# Patient Record
Sex: MALE | Race: WHITE | NOT HISPANIC OR LATINO | Employment: FULL TIME | URBAN - METROPOLITAN AREA
[De-identification: names, ages, dates, MRNs, and addresses within clinical notes are randomized per-mention and may not be internally consistent; named-entity substitution may affect disease eponyms.]

---

## 2017-10-16 ENCOUNTER — ALLIED HEALTH/NURSE VISIT (OUTPATIENT)
Dept: NURSING | Facility: CLINIC | Age: 22
End: 2017-10-16
Payer: COMMERCIAL

## 2017-10-16 DIAGNOSIS — Z23 NEED FOR PROPHYLACTIC VACCINATION AND INOCULATION AGAINST INFLUENZA: Primary | ICD-10-CM

## 2017-10-16 PROCEDURE — 90686 IIV4 VACC NO PRSV 0.5 ML IM: CPT

## 2017-10-16 PROCEDURE — 90471 IMMUNIZATION ADMIN: CPT

## 2017-10-16 PROCEDURE — 99207 ZZC NO CHARGE NURSE ONLY: CPT

## 2017-10-16 NOTE — MR AVS SNAPSHOT
After Visit Summary   10/16/2017    Martin Bains    MRN: [id number]           Patient Information     Date Of Birth          1995        Visit Information        Provider Department      10/16/2017 3:00 PM JOHN NURSE AB [facility] [place]        Today's Diagnoses     Need for prophylactic vaccination and inoculation against influenza    -  1       Follow-ups after your visit        Who to contact     If you have questions or need follow up information about today's clinic visit or your schedule please contact [facility] directly at [phone].  Normal or non-critical lab and imaging results will be communicated to you by Cogniihart, letter or phone within 4 business days after the clinic has received the results. If you do not hear from us within 7 days, please contact the clinic through Statzupt or phone. If you have a critical or abnormal lab result, we will notify you by phone as soon as possible.  Submit refill requests through PlayJam or call your pharmacy and they will forward the refill request to us. Please allow 3 business days for your refill to be completed.          Additional Information About Your Visit        MyChart Information     PlayJam gives you secure access to your electronic health record. If you see a primary care provider, you can also send messages to your care team and make appointments. If you have questions, please call your primary care clinic.  If you do not have a primary care provider, please call [phone] and they will assist you.        Care EveryWhere ID     This is your Care EveryWhere ID. This could be used by other organizations to access your [facility] medical records  [id number]         Blood Pressure from Last 3 Encounters:   11/25/16 112/72   07/25/16 112/60   08/25/15 116/66    Weight from Last 3 Encounters:   11/25/16 166 lb (75.3 kg)   07/25/16 164 lb 1 oz (74.4 kg)   08/25/15 148 lb 2 oz (67.2 kg) (42 %)*     * Growth percentiles  are based on [facility] 2-20 Years data.              We Performed the Following     FLU VAC, SPLIT VIRUS IM > 3 YO (QUADRIVALENT) [09034]     Vaccine Administration, Initial [75615]        Primary Care Provider Office Phone # Fax #    Camilo Turner [phone] [address] [place] [address]        Equal Access to Services     [facility]: Hadii aad ku hadasho Soomaali, waaxda luqadaha, qaybta kaalmada adeegyada, waxay idiin hayaan adeeg khcarlyloraine lamiriamn . So [facility] [phone].    ATENCIÓN: Si habla español, tiene a hubbard disposición servicios gratuitos de asistencia lingüística. Llame al [phone].    We comply with applicable federal civil rights laws and [place] laws. We do not discriminate on the basis of race, color, national origin, age, disability, sex, sexual orientation, or gender identity.            Thank you!     [facility]. Our goal is always to provide you with excellent care. Hearing back from our patients is one way we can continue to improve our services. Please take a few minutes to complete the written survey that you may receive in the mail after your visit with us. Thank you!             Your Updated Medication List - Protect others around you: Learn how to safely use, store and throw away your medicines at www.disposemymeds.org.          This list is accurate as of: 10/16/17  3:09 PM.  Always use your most recent med list.                   Brand Name Dispense Instructions for use Diagnosis    cholecalciferol 1000 UNIT tablet    vitamin D    100 tablet    Take 1 tablet (1,000 Units) by mouth daily        CLARITIN 10 MG tablet   Generic drug:  loratadine     30 tablet    Take 1 tablet (10 mg) by mouth daily        imiquimod 5 % cream    ALDARA    12 packet    Apply a small sized amount to warts three times weekly at bedtime.   Wash off after 8 hours.   May use for up to 16 weeks.    Plantar wart, right foot        VITAMIN C PO

## 2018-06-09 ENCOUNTER — MYC MEDICAL ADVICE (OUTPATIENT)
Dept: PEDIATRICS | Facility: CLINIC | Age: 23
End: 2018-06-09

## 2018-06-09 NOTE — LETTER
June 11, 2018      Martin Bains  [address] [place] [address]        To Whom It May Concern:    Martin Bains is seen in our clinic.     He suffers from  Nonverbal Learning Disorder (NVLD), which includes a sensory integration disorder, and possesses sensitivity behaviors that fall within the autism spectrum. Therefore, I have requested [facility] that I be placed in a living unit (small apartment) which is as quiet as possible and not near a heavily trafficked part of the building.    This condition is a legal disability, and is necessary for him to fully use and enjoy his apartment.          Sincerely,        Camilo Turner MD

## 2018-06-11 NOTE — TELEPHONE ENCOUNTER
"Patient is needing a letter to their  with the requirements below in the letter. This is a request that the patient live in a small, quiet apartment not near a heavily trafficked part of the building.     Email or mail letter as directed in Balakam message.     Copied from Balakam message:  \"We will require communication from a qualified person (such as a physician, therapist, counselor or other   professional) who is  familiar with you and your condition,  to confirm:  (1) whether you are disabled as   defined by law, (2) what accommodation is needed, and (3) whether the accommodation is necessary in order for you to fully use and enjoy your apartment.  Sometimes we will also need confirmation of the connection  between the accommodation and the disability, where this is not clear to us.  Confirmation can come in writing such as a letter from a doctor or therapist with the author s contact information, but  can also occur  via a phone conversation or e&#[id number];mail.\"      "

## 2018-06-12 ENCOUNTER — OFFICE VISIT (OUTPATIENT)
Dept: PEDIATRICS | Facility: CLINIC | Age: 23
End: 2018-06-12
Payer: COMMERCIAL

## 2018-06-12 VITALS
HEART RATE: 65 BPM | BODY MASS INDEX: 21.87 KG/M2 | DIASTOLIC BLOOD PRESSURE: 78 MMHG | OXYGEN SATURATION: 99 % | WEIGHT: 165 LBS | HEIGHT: 73 IN | TEMPERATURE: 97.8 F | SYSTOLIC BLOOD PRESSURE: 112 MMHG

## 2018-06-12 DIAGNOSIS — F88 SENSORY INTEGRATION DISORDER: ICD-10-CM

## 2018-06-12 DIAGNOSIS — Z23 NEED FOR TETANUS BOOSTER: Primary | ICD-10-CM

## 2018-06-12 DIAGNOSIS — Z23 NEED FOR HPV VACCINATION: ICD-10-CM

## 2018-06-12 PROCEDURE — 90715 TDAP VACCINE 7 YRS/> IM: CPT | Performed by: INTERNAL MEDICINE

## 2018-06-12 PROCEDURE — 90471 IMMUNIZATION ADMIN: CPT | Performed by: INTERNAL MEDICINE

## 2018-06-12 PROCEDURE — 90472 IMMUNIZATION ADMIN EACH ADD: CPT | Performed by: INTERNAL MEDICINE

## 2018-06-12 PROCEDURE — 99213 OFFICE O/P EST LOW 20 MIN: CPT | Mod: 25 | Performed by: INTERNAL MEDICINE

## 2018-06-12 PROCEDURE — 90651 9VHPV VACCINE 2/3 DOSE IM: CPT | Performed by: INTERNAL MEDICINE

## 2018-06-12 NOTE — MR AVS SNAPSHOT
After Visit Summary   6/12/2018    Martin Bains    MRN: [id number]           Patient Information     Date Of Birth          1995        Visit Information        Provider Department      6/12/2018 1:00 PM [facility]        Today's Diagnoses     Need for tetanus booster    -  1      Care Instructions    Tetanus shot today.    Letter written today.    Follow up yearly or biannually for a complete physcial exam>    Camilo Turner MD  Internal Medicine and Pediatrics             Follow-ups after your visit        Follow-up notes from your care team     Return in about 1 year (around 6/12/2019) for Physical Exam.      Who to contact     If you have questions or need follow up information about today's clinic visit or your schedule please contact [facility] directly at [phone].  Normal or non-critical lab and imaging results will be communicated to you by MyChart, letter or phone within 4 business days after the clinic has received the results. If you do not hear from us within 7 days, please contact the clinic through Bug Labshart or phone. If you have a critical or abnormal lab result, we will notify you by phone as soon as possible.  Submit refill requests through Anteryon or call your pharmacy and they will forward the refill request to us. Please allow 3 business days for your refill to be completed.          Additional Information About Your Visit        MyChart Information     Anteryon gives you secure access to your electronic health record. If you see a primary care provider, you can also send messages to your care team and make appointments. If you have questions, please call your primary care clinic.  If you do not have a primary care provider, please call [phone] and they will assist you.        Care EveryWhere ID     This is your Care EveryWhere ID. This could be used by other organizations to access your [place] medical records  [id number]    "     Your Vitals Were     Pulse Temperature Height Pulse Oximetry BMI (Body Mass Index)       65 97.8  F (36.6  C) (Oral) 6' 1\" (1.854 m) 99% 21.77 kg/m2        Blood Pressure from Last 3 Encounters:   06/12/18 112/78   11/25/16 112/72   07/25/16 112/60    Weight from Last 3 Encounters:   06/12/18 165 lb (74.8 kg)   11/25/16 166 lb (75.3 kg)   07/25/16 164 lb 1 oz (74.4 kg)              We Performed the Following     TDAP VACCINE (ADACEL)        Primary Care Provider Office Phone # Fax #    Camilo Turner [phone]       [facility] [place] [address]        Equal Access to Services     [facility] : Hadii aad ku hadasho Sovenu, waaxda luqadaha, qaybta kaalmada adeegyada, bin myers . So [facility] [phone].    ATENCIÓN: Si habla español, tiene a hubbard disposición servicios gratuitos de asistencia lingüística. LlThe [facility] [phone].    We comply with applicable federal civil rights laws and [place] laws. We do not discriminate on the basis of race, color, national origin, age, disability, sex, sexual orientation, or gender identity.            Thank you!     Thank you for choosing [facility]  for your care. Our goal is always to provide you with excellent care. Hearing back from our patients is one way we can continue to improve our services. Please take a few minutes to complete the written survey that you may receive in the mail after your visit with us. Thank you!             Your Updated Medication List - Protect others around you: Learn how to safely use, store and throw away your medicines at www.disposemymeds.org.          This list is accurate as of 6/12/18  1:20 PM.  Always use your most recent med list.                   Brand Name Dispense Instructions for use Diagnosis    cholecalciferol 1000 UNIT tablet    vitamin D3    100 tablet    Take 1 tablet (1,000 Units) by mouth daily        CLARITIN 10 MG tablet   Generic drug:  " loratadine     30 tablet    Take 1 tablet (10 mg) by mouth daily        imiquimod 5 % cream    ALDARA    12 packet    Apply a small sized amount to warts three times weekly at bedtime.   Wash off after 8 hours.   May use for up to 16 weeks.    Plantar wart, right foot       VITAMIN C PO

## 2018-06-12 NOTE — NURSING NOTE
Screening Questionnaire for Adult Immunization    Are you sick today?   No   Do you have allergies to medications, food, a vaccine component or latex?   No   Have you ever had a serious reaction after receiving a vaccination?   No   Do you have a long-term health problem with heart disease, lung disease, asthma, kidney disease, metabolic disease (e.g. diabetes), anemia, or other blood disorder?   No   Do you have cancer, leukemia, HIV/AIDS, or any other immune system problem?   No   In the past 3 months, have you taken medications that affect  your immune system, such as prednisone, other steroids, or anticancer drugs; drugs for the treatment of rheumatoid arthritis, Crohn s disease, or psoriasis; or have you had radiation treatments?   No   Have you had a seizure, or a brain or other nervous system problem?   No   During the past year, have you received a transfusion of blood or blood     products, or been given immune (gamma) globulin or antiviral drug?   No   For women: Are you pregnant or is there a chance you could become        pregnant during the next month?   No   Have you received any vaccinations in the past 4 weeks?   No     Immunization questionnaire answers were all negative.        Per orders of Dr. Turner, injection of Adacel and Gardasil given by Asrtid Liao. Patient instructed to remain in clinic for 15 minutes afterwards, and to report any adverse reaction to me immediately.       Screening performed by Astrid Liao on 6/12/2018 at 1:30 PM.

## 2018-06-12 NOTE — PROGRESS NOTES
SUBJECTIVE:   Martin Bains is a 22 year old male who presents to clinic today for the following health issues:      Pt needs letter for graduate school.     To begin grad  School, 2 years at .      Feels like has inability to concentrate and sleep, since a young child.  Diagnosed with a sensory disorder, can cause frustration and anger.     In [facility], had a roommate; no issues.  But the new apartment he is going to is louder on weekends and during week.     Problem list and histories reviewed & adjusted, as indicated.  Additional history: as documented    Patient Active Problem List   Diagnosis     Sensory integration disorder     Past Surgical History:   Procedure Laterality Date     HC CREATE EARDRUM OPENING,GEN ANESTH  1997    P.E. Tubes       Social History   Substance Use Topics     Smoking status: Never Smoker     Smokeless tobacco: Never Used      Comment: non smoking home     Alcohol use No     Family History   Problem Relation Age of Onset     C.A.D. Maternal Grandmother      [id number]   maternal uncleDM and CAD     CANCER Paternal Grandmother       lung CA     CANCER Paternal Grandfather      Hodemmy  age 52     Family History Negative Mother          Current Outpatient Prescriptions   Medication Sig Dispense Refill     Ascorbic Acid (VITAMIN C PO)        cholecalciferol (VITAMIN D) 1000 UNIT tablet Take 1 tablet (1,000 Units) by mouth daily 100 tablet 3     imiquimod (ALDARA) 5 % cream Apply a small sized amount to warts three times weekly at bedtime.   Wash off after 8 hours.   May use for up to 16 weeks. 12 packet 3     loratadine (CLARITIN) 10 MG tablet Take 1 tablet (10 mg) by mouth daily 30 tablet 1     No Known Allergies  BP Readings from Last 3 Encounters:   18 112/78   16 112/72   16 112/60    Wt Readings from Last 3 Encounters:   18 165 lb (74.8 kg)   16 166 lb (75.3 kg)   16 164 lb 1 oz (74.4 kg)                  Labs reviewed in  "EPIC    Reviewed and updated as needed this visit by clinical staff  Tobacco  Allergies  Meds  Med Hx  Surg Hx  Fam Hx  Soc Hx      Reviewed and updated as needed this visit by Provider         ROS:  CONSTITUTIONAL: NEGATIVE for fever, chills, change in weight  ENT/MOUTH: NEGATIVE for ear, mouth and throat problems  RESP: NEGATIVE for significant cough or SOB  CV: NEGATIVE for chest pain, palpitations or peripheral edema    OBJECTIVE:                                                    /78 (BP Location: Right arm, Cuff Size: Adult Regular)  Pulse 65  Temp 97.8  F (36.6  C) (Oral)  Ht 6' 1\" (1.854 m)  Wt 165 lb (74.8 kg)  SpO2 99%  BMI 21.77 kg/m2  Body mass index is 21.77 kg/(m^2).   GENERAL: healthy, alert, well nourished, well hydrated, no distress  HENT: ear canals- normal; TMs- normal; Nose- normal; Mouth- no ulcers, no lesions  NECK: no tenderness, no adenopathy, no asymmetry, no masses, no stiffness; thyroid- normal to palpation  RESP: lungs clear to auscultation - no rales, no rhonchi, no wheezes  CV: regular rates and rhythm, normal S1 S2, no S3 or S4 and no murmur, no click or rub -  ABDOMEN: soft, no tenderness, no  hepatosplenomegaly, no masses, normal bowel sounds    Diagnostic test results:  Diagnostic Test Results:  none      ASSESSMENT/PLAN:                                                    1. Need for tetanus booster    - TDAP VACCINE (ADACEL)  - ADMIN 1st VACCINE    2. Need for HPV vaccination    - EA ADD'L VACCINE  - HPV, IM (9 - 26 YRS) - Gardasil 9    3. Sensory integration disorder  Letter written for his grad school housing.  Follow up for any new symptoms.     E4: Spent 25 minutes face to face.     More than 50% of the time was spent in counseling and coordination of care of these issues.        See Patient Instructions    Camilo Turner MD  [facility] [place]  sens   "

## 2018-06-12 NOTE — TELEPHONE ENCOUNTER
Letter printed, and was faxed to email provided below.   It should also be available on [employer] if he needs to print it.   Sent [employer] message letting him know as well.     Astrid Liao MA   June 12, 2018,  9:55 AM

## 2018-06-15 ENCOUNTER — MYC MEDICAL ADVICE (OUTPATIENT)
Dept: PEDIATRICS | Facility: CLINIC | Age: 23
End: 2018-06-15

## 2018-07-03 ENCOUNTER — MYC MEDICAL ADVICE (OUTPATIENT)
Dept: PEDIATRICS | Facility: CLINIC | Age: 23
End: 2018-07-03

## 2018-07-03 NOTE — LETTER
July 6, 2018      Martin Bains  [address] [address]        To Whom It May Concern:    Martin Bains was seen in our clinic. He must wear prescription ear plugs; they are a medical necessity (due to his sensory integration disorder). These ear plugs will help with his sleeping.    Sincerely,        Camilo Turner MD

## 2018-07-06 NOTE — TELEPHONE ENCOUNTER
Letter printed and fax to email address provided by pt.  Pt notified.     Astrid Liao MA   July 6, 2018,  8:57 AM

## 2018-08-14 ENCOUNTER — OFFICE VISIT (OUTPATIENT)
Dept: PEDIATRICS | Facility: CLINIC | Age: 23
End: 2018-08-14
Payer: COMMERCIAL

## 2018-08-14 VITALS
SYSTOLIC BLOOD PRESSURE: 114 MMHG | BODY MASS INDEX: 22 KG/M2 | OXYGEN SATURATION: 99 % | HEIGHT: 73 IN | DIASTOLIC BLOOD PRESSURE: 68 MMHG | TEMPERATURE: 98.1 F | HEART RATE: 78 BPM | WEIGHT: 166 LBS

## 2018-08-14 DIAGNOSIS — Z11.4 SCREENING FOR HIV (HUMAN IMMUNODEFICIENCY VIRUS): ICD-10-CM

## 2018-08-14 DIAGNOSIS — Z23 NEED FOR HPV VACCINE: ICD-10-CM

## 2018-08-14 DIAGNOSIS — Z00.00 ROUTINE GENERAL MEDICAL EXAMINATION AT A HEALTH CARE FACILITY: Primary | ICD-10-CM

## 2018-08-14 PROCEDURE — 90471 IMMUNIZATION ADMIN: CPT | Performed by: INTERNAL MEDICINE

## 2018-08-14 PROCEDURE — 99395 PREV VISIT EST AGE 18-39: CPT | Mod: 25 | Performed by: INTERNAL MEDICINE

## 2018-08-14 PROCEDURE — 90651 9VHPV VACCINE 2/3 DOSE IM: CPT | Performed by: INTERNAL MEDICINE

## 2018-08-14 ASSESSMENT — ENCOUNTER SYMPTOMS
CHILLS: 0
ABDOMINAL PAIN: 0
DIARRHEA: 0
COUGH: 0
HEMATOCHEZIA: 0
EYE PAIN: 0
PARESTHESIAS: 0
PALPITATIONS: 0
HEADACHES: 0
FEVER: 0
SHORTNESS OF BREATH: 0
WEAKNESS: 0
CONSTIPATION: 0
SORE THROAT: 0
MYALGIAS: 0
HEARTBURN: 0
HEMATURIA: 0
DIZZINESS: 0
FREQUENCY: 0
JOINT SWELLING: 0
DYSURIA: 0
NERVOUS/ANXIOUS: 0
ARTHRALGIAS: 0
NAUSEA: 0

## 2018-08-14 NOTE — MR AVS SNAPSHOT
After Visit Summary   8/14/2018    Martin Bains    MRN: [id number]           Patient Information     Date Of Birth          1995        Visit Information        Provider Department      8/14/2018 11:20 AM Camilo Turner MD [facility]        Today's Diagnoses     Screening for HIV (human immunodeficiency virus)        Need for HPV vaccine        Routine general medical examination at a health care facility          Care Instructions      HPV #2 today, and #3 in next 4-12 months.    Set up fasting labs.     Camilo Turner MD  Internal Medicine and Pediatrics       Preventive Health Recommendations  Male Ages 21 - 25     Yearly exam:             See your health care provider every year in order to  o   Review health changes.   o   Discuss preventive care.    o   Review your medicines if your doctor has prescribed any.    You should be tested each year for STDs (sexually transmitted diseases).     Talk to your provider about cholesterol testing.      If you are at risk for diabetes, you should have a diabetes test (fasting glucose).    Shots: Get a flu shot each year. Get a tetanus shot every 10 years.     Nutrition:    Eat at least 5 servings of fruits and vegetables daily.     Eat whole-grain bread, whole-wheat pasta and brown rice instead of white grains and rice.     Get adequate calcium and Vitamin D.     Lifestyle    Exercise for at least 150 minutes a week (30 minutes a day, 5 days a week). This will help you control your weight and prevent disease.     Limit alcohol to one drink per day.     No smoking.     Wear sunscreen to prevent skin cancer.     See your dentist every six months for an exam and cleaning.             Follow-ups after your visit        Follow-up notes from your care team     Return in about 1 year (around 8/14/2019) for Physical Exam.      Future tests that were ordered for you today     Open Future Orders        Priority Expected Expires Ordered    HIV  "Screening Routine  9/14/2018 8/14/2018    Lipid panel reflex to direct LDL Fasting Routine  9/14/2018 8/14/2018    Basic metabolic panel  (Ca, Cl, CO2, Creat, Gluc, K, Na, BUN) Routine  9/14/2018 8/14/2018            Who to contact     If you have questions or need follow up information about today's clinic visit or your schedule please contact [facility] [place] directly at [phone].  Normal or non-critical lab and imaging results will be communicated to you by SpiderCloud Wirelesshart, letter or phone within 4 business days after the clinic has received the results. If you do not hear from us within 7 days, please contact the clinic through Jiongji Appt or phone. If you have a critical or abnormal lab result, we will notify you by phone as soon as possible.  Submit refill requests through Shipu or call your pharmacy and they will forward the refill request to us. Please allow 3 business days for your refill to be completed.          Additional Information About Your Visit        Shipu Information     Shipu gives you secure access to your electronic health record. If you see a primary care provider, you can also send messages to your care team and make appointments. If you have questions, please call your primary care clinic.  If you do not have a primary care provider, please call [phone] and they will assist you.        Care EveryWhere ID     This is your Care EveryWhere ID. This could be used by other organizations to access your [place] medical records  [id number]        Your Vitals Were     Pulse Temperature Height Pulse Oximetry BMI (Body Mass Index)       78 98.1  F (36.7  C) (Oral) 6' 1\" (1.854 m) 99% 21.9 kg/m2        Blood Pressure from Last 3 Encounters:   08/14/18 114/68   06/12/18 112/78   11/25/16 112/72    Weight from Last 3 Encounters:   08/14/18 166 lb (75.3 kg)   06/12/18 165 lb (74.8 kg)   11/25/16 166 lb (75.3 kg)              We Performed the Following     HPV, IM (9 - 26 YRS) - Gardasil 9     "    Primary Care Provider Office Phone # Fax #    Camilo Turner [phone] [address] [place] [address]        Equal Access to Services     STARRIKI FRANKIE : Bebe mendoza leilani Laguna, wamelisada luqadaha, qaybta kakeithda viviana, bin rodrigues lamiriambimal amber. So [facility] [phone].    ATENCIÓN: Si habla español, tiene a hubbard disposición servicios gratuitos de asistencia lingüística. Llame al [phone].    We comply with applicable federal civil rights laws and [place] laws. We do not discriminate on the basis of race, color, national origin, age, disability, sex, sexual orientation, or gender identity.            Thank you!     Thank you for choosing [facility]  for your care. Our goal is always to provide you with excellent care. Hearing back from our patients is one way we can continue to improve our services. Please take a few minutes to complete the written survey that you may receive in the mail after your visit with us. Thank you!             Your Updated Medication List - Protect others around you: Learn how to safely use, store and throw away your medicines at www.disposemymeds.org.          This list is accurate as of 8/14/18 11:40 AM.  Always use your most recent med list.                   Brand Name Dispense Instructions for use Diagnosis    cholecalciferol 1000 UNIT tablet    vitamin D3    100 tablet    Take 1 tablet (1,000 Units) by mouth daily        CLARITIN 10 MG tablet   Generic drug:  loratadine     30 tablet    Take 1 tablet (10 mg) by mouth daily        VITAMIN C PO

## 2018-08-14 NOTE — NURSING NOTE
Screening Questionnaire for Adult Immunization    Are you sick today?   No   Do you have allergies to medications, food, a vaccine component or latex?   No   Have you ever had a serious reaction after receiving a vaccination?   No   Do you have a long-term health problem with heart disease, lung disease, asthma, kidney disease, metabolic disease (e.g. diabetes), anemia, or other blood disorder?   No   Do you have cancer, leukemia, HIV/AIDS, or any other immune system problem?   No   In the past 3 months, have you taken medications that affect  your immune system, such as prednisone, other steroids, or anticancer drugs; drugs for the treatment of rheumatoid arthritis, Crohn s disease, or psoriasis; or have you had radiation treatments?   No   Have you had a seizure, or a brain or other nervous system problem?   No   During the past year, have you received a transfusion of blood or blood     products, or been given immune (gamma) globulin or antiviral drug?   No   For women: Are you pregnant or is there a chance you could become        pregnant during the next month?   No   Have you received any vaccinations in the past 4 weeks?   No     Immunization questionnaire answers were all negative.        Per orders of Dr. Turner, injection of Gardasil given by Astrid Liao. Patient instructed to remain in clinic for 15 minutes afterwards, and to report any adverse reaction to me immediately.       Screening performed by Astrid Liao on 8/14/2018 at 11:46 AM.

## 2018-08-14 NOTE — PROGRESS NOTES
SUBJECTIVE:   CC: Martin Bains is an 23 year old male who presents for preventative health visit.     Physical   Annual:     Getting at least 3 servings of Calcium per day:  Yes    Bi-annual eye exam:  Yes    Dental care twice a year:  Yes    Sleep apnea or symptoms of sleep apnea:  None    Diet:  Regular (no restrictions)    Frequency of exercise:  4-5 days/week    Duration of exercise:  15-30 minutes    Taking medications regularly:  Not Applicable    Additional concerns today:  No      Today's PHQ-2 Score:   PHQ-2 ( 1999 Pfizer) 8/14/2018   Q1: Little interest or pleasure in doing things 0   Q2: Feeling down, depressed or hopeless 0   PHQ-2 Score 0   Q1: Little interest or pleasure in doing things Not at all   Q2: Feeling down, depressed or hopeless Not at all   PHQ-2 Score 0       Abuse: Current or Past(Physical, Sexual or Emotional)- No  Do you feel safe in your environment - Yes    Social History   Substance Use Topics     Smoking status: Never Smoker     Smokeless tobacco: Never Used      Comment: non smoking home     Alcohol use No     Alcohol Use 8/14/2018   If you drink alcohol do you typically have greater than 3 drinks per day OR greater than 7 drinks per week? No       Last PSA: No results found for: PSA    Reviewed orders with patient. Reviewed health maintenance and updated orders accordingly - Yes  Labs reviewed in EPIC  BP Readings from Last 3 Encounters:   08/14/18 114/68   06/12/18 112/78   11/25/16 112/72    Wt Readings from Last 3 Encounters:   08/14/18 166 lb (75.3 kg)   06/12/18 165 lb (74.8 kg)   11/25/16 166 lb (75.3 kg)                  Patient Active Problem List   Diagnosis     Sensory integration disorder     Past Surgical History:   Procedure Laterality Date     HC CREATE EARDRUM OPENING,GEN ANESTH  1997    P.E. Tubes       Social History   Substance Use Topics     Smoking status: Never Smoker     Smokeless tobacco: Never Used      Comment: non smoking home     Alcohol use No      "Family History   Problem Relation Age of Onset     C.A.D. Maternal Grandmother      [id number]   maternal uncleDM and CAD     Cancer Paternal Grandmother       lung CA     Cancer Paternal Grandfather      Artemio  age 52     Family History Negative Mother          Current Outpatient Prescriptions   Medication Sig Dispense Refill     Ascorbic Acid (VITAMIN C PO)        cholecalciferol (VITAMIN D) 1000 UNIT tablet Take 1 tablet (1,000 Units) by mouth daily 100 tablet 3     loratadine (CLARITIN) 10 MG tablet Take 1 tablet (10 mg) by mouth daily 30 tablet 1     No Known Allergies  No lab results found.     Reviewed and updated as needed this visit by clinical staff  Tobacco  Allergies  Meds  Med Hx  Surg Hx  Fam Hx  Soc Hx        Reviewed and updated as needed this visit by Provider          History reviewed. No pertinent past medical history.   Past Surgical History:   Procedure Laterality Date     HC CREATE EARDRUM OPENING,GEN ANESTH  1997    P.E. Tubes       Review of Systems   Constitutional: Negative for chills and fever.   HENT: Negative for congestion, ear pain, hearing loss and sore throat.    Eyes: Negative for pain and visual disturbance.   Respiratory: Negative for cough and shortness of breath.    Cardiovascular: Negative for chest pain, palpitations and peripheral edema.   Gastrointestinal: Negative for abdominal pain, constipation, diarrhea, heartburn, hematochezia and nausea.   Genitourinary: Negative for discharge, dysuria, frequency, genital sores, hematuria, impotence and urgency.   Musculoskeletal: Negative for arthralgias, joint swelling and myalgias.   Skin: Negative for rash.   Neurological: Negative for dizziness, weakness, headaches and paresthesias.   Psychiatric/Behavioral: Negative for mood changes. The patient is not nervous/anxious.          OBJECTIVE:   /68 (BP Location: Right arm, Cuff Size: Adult Regular)  Pulse 78  Temp 98.1  F (36.7  C) (Oral)  Ht 6' 1\" " "(1.854 m)  Wt 166 lb (75.3 kg)  SpO2 99%  BMI 21.9 kg/m2    Physical Exam  GENERAL: healthy, alert and no distress  EYES: Eyes grossly normal to inspection, PERRL and conjunctivae and sclerae normal  HENT: ear canals and TM's normal, nose and mouth without ulcers or lesions  NECK: no adenopathy, no asymmetry, masses, or scars and thyroid normal to palpation  RESP: lungs clear to auscultation - no rales, rhonchi or wheezes  CV: regular rate and rhythm, normal S1 S2, no S3 or S4, no murmur, click or rub, no peripheral edema and peripheral pulses strong  ABDOMEN: soft, nontender, no hepatosplenomegaly, no masses and bowel sounds normal   (male): normal male genitalia without lesions or urethral discharge, no hernia  MS: no gross musculoskeletal defects noted, no edema  SKIN: no suspicious lesions or rashes  NEURO: Normal strength and tone, mentation intact and speech normal  PSYCH: mentation appears normal, affect normal/bright    Diagnostic Test Results:  none     ASSESSMENT/PLAN:   1. Screening for HIV (human immunodeficiency virus)    - HIV Screening; Future    2. Need for HPV vaccine    - HPV, IM (9 - 26 YRS) - Gardasil 9    3. Routine general medical examination at a health care facility  To follow up in 1 -3 weeks for fasting blood work.   - Lipid panel reflex to direct LDL Fasting; Future  - Basic metabolic panel  (Ca, Cl, CO2, Creat, Gluc, K, Na, BUN); Future    COUNSELING:   Reviewed preventive health counseling, as reflected in patient instructions       Consider AAA screening for ages 65-75 and smoking history       Regular exercise       Healthy diet/nutrition       Vision screening       Family planning       Safe sex practices/STD prevention       Colon cancer screening       Prostate cancer screening    BP Readings from Last 1 Encounters:   08/14/18 114/68     Estimated body mass index is 21.9 kg/(m^2) as calculated from the following:    Height as of this encounter: 6' 1\" (1.854 m).    Weight as of " this encounter: 166 lb (75.3 kg).           reports that he has never smoked. He has never used smokeless tobacco.      Counseling Resources:  ATP IV Guidelines  Pooled Cohorts Equation Calculator  FRAX Risk Assessment  ICSI Preventive Guidelines  Dietary Guidelines for Americans, 2010  USDA's MyPlate  ASA Prophylaxis  Lung CA Screening    Camilo Turner MD  [facility]

## 2018-12-26 ENCOUNTER — ALLIED HEALTH/NURSE VISIT (OUTPATIENT)
Dept: NURSING | Facility: CLINIC | Age: 23
End: 2018-12-26
Payer: COMMERCIAL

## 2018-12-26 DIAGNOSIS — Z23 ENCOUNTER FOR IMMUNIZATION: Primary | ICD-10-CM

## 2018-12-26 PROCEDURE — 90651 9VHPV VACCINE 2/3 DOSE IM: CPT

## 2018-12-26 PROCEDURE — 90471 IMMUNIZATION ADMIN: CPT

## 2018-12-26 PROCEDURE — 99207 ZZC NO CHARGE NURSE ONLY: CPT

## 2018-12-26 NOTE — PROGRESS NOTES
Screening Questionnaire for Adult Immunization    Are you sick today?   No   Do you have allergies to medications, food, a vaccine component or latex?   No   Have you ever had a serious reaction after receiving a vaccination?   No   Do you have a long-term health problem with heart disease, lung disease, asthma, kidney disease, metabolic disease (e.g. diabetes), anemia, or other blood disorder?   No   Do you have cancer, leukemia, HIV/AIDS, or any other immune system problem?   No   In the past 3 months, have you taken medications that affect  your immune system, such as prednisone, other steroids, or anticancer drugs; drugs for the treatment of rheumatoid arthritis, Crohn s disease, or psoriasis; or have you had radiation treatments?   No   Have you had a seizure, or a brain or other nervous system problem?   No   During the past year, have you received a transfusion of blood or blood     products, or been given immune (gamma) globulin or antiviral drug?   No   For women: Are you pregnant or is there a chance you could become        pregnant during the next month?   No   Have you received any vaccinations in the past 4 weeks?   No     Immunization questionnaire answers were all negative.        Per orders of Dr. Turner, injection of HPV vaccine given by Romina Manuel. Patient instructed to remain in clinic for 15 minutes afterwards, and to report any adverse reaction to me immediately.       Screening performed by Romina Manuel on 12/26/2018 at 1:53 PM.

## 2019-01-15 ENCOUNTER — OFFICE VISIT (OUTPATIENT)
Dept: URGENT CARE | Facility: URGENT CARE | Age: 24
End: 2019-01-15
Payer: COMMERCIAL

## 2019-01-15 VITALS
TEMPERATURE: 98.8 F | HEART RATE: 97 BPM | DIASTOLIC BLOOD PRESSURE: 82 MMHG | RESPIRATION RATE: 16 BRPM | WEIGHT: 160 LBS | BODY MASS INDEX: 21.11 KG/M2 | OXYGEN SATURATION: 99 % | SYSTOLIC BLOOD PRESSURE: 126 MMHG

## 2019-01-15 DIAGNOSIS — S61.213A LACERATION OF LEFT MIDDLE FINGER WITHOUT FOREIGN BODY WITHOUT DAMAGE TO NAIL, INITIAL ENCOUNTER: Primary | ICD-10-CM

## 2019-01-15 PROCEDURE — 99213 OFFICE O/P EST LOW 20 MIN: CPT | Mod: 25 | Performed by: PHYSICIAN ASSISTANT

## 2019-01-15 PROCEDURE — 12001 RPR S/N/AX/GEN/TRNK 2.5CM/<: CPT | Performed by: PHYSICIAN ASSISTANT

## 2019-01-15 RX ORDER — HYDROCODONE BITARTRATE AND ACETAMINOPHEN 5; 325 MG/1; MG/1
1 TABLET ORAL EVERY 6 HOURS PRN
Qty: 12 TABLET | Refills: 0 | Status: SHIPPED | OUTPATIENT
Start: 2019-01-15 | End: 2019-01-18

## 2019-01-15 ASSESSMENT — PAIN SCALES - GENERAL: PAINLEVEL: MODERATE PAIN (4)

## 2019-01-15 NOTE — PATIENT INSTRUCTIONS
Patient Education      * Laceration (All Closures)  A laceration is a cut through the skin. This will usually require stitches (sutures) or staples if it is deep. Minor cuts may be treated with a tape closure ( Steri-Strips ) or Dermabond skin glue.    Home Care:  PAIN MEDICINE: You may use acetaminophen (Tylenol) 650-1000 mg every 6 hours or ibuprofen (Motrin, Advil) 600 mg every 6-8 hours with food to control pain, if you are able to take these medicines. [NOTE: If you have chronic liver or kidney disease or ever had a stomach ulcer or GI bleeding, talk with your doctor before using these medicines.]  EXTREMITY, FACE or TRUNK WOUNDS:    Keep the wound clean and dry. If a bandage was applied and it becomes wet or dirty, replace it. Otherwise, leave it in place for the first 24 hours.    If stitches or staples were used, clean the wound daily. Protect the wound from sunlight and tanning lamps.    After removing the bandage, wash the area with soap and water. Use a wet cotton swab (Q tip) to loosen and remove any blood or crust that forms.    After cleaning, apply a thin layer of Polysporin or Bacitracin ointment. This will keep the wound clean and make it easier to remove the stitches or staples. Reapply a fresh bandage.    You may remove the bandage to shower as usual after the first 24 hours, but do not soak the area in water (no swimming) until the stitches or staples are removed.    If Steri-Strips were used, keep the area clean and dry. If it becomes wet, blot it dry with a towel. It is okay to take a brief shower, but avoid scrubbing the area.    If Dermabond skin adhesive was used, do not scratch, rub or pick at the adhesive film. Do not place tape directly over the film. Do not apply liquid, ointment or creams to the wound while the film is in place. Do not clean the wound with peroxide and do not apply ointments. Avoid activities that cause heavy sweating until the film has fallen off. Protect the wound  from prolonged exposure to sunlight or tanning lamps. You may shower as usual but do not soak the wound in water (no baths or swimming). The film will fall off by itself in 5-10 days.  SCALP WOUNDS: During the first two days, you may carefully rinse your hair in the shower to remove blood, glass or dirt particles. After two days, you may shower and shampoo your hair normally. Do not soak your scalp in the tub or go swimming until the stitches or staples have been removed.  MOUTH WOUNDS: Eat soft foods to reduce pain. If the cut is inside of your mouth, clean by rinsing after each meal and at bedtime with a mixture of equal parts water and Hydrogen Peroxide (do not swallow!). Or, you can use a cotton swab to directly apply Hydrogen Peroxide onto the cut.  After the wound is done healing, use sunscreen over the area whenever exposed for the next 6 minths to avoid a darker scar.  Follow Up:  Most skin wounds heal within ten days. Mouth and facial wounds heal within five days. However, even with proper treatment, a wound infection may sometimes occur. Therefore, you should check the wound daily for signs of infection listed below.  Stitches should be removed from the face within five days; stitches and staples should be removed from other parts of the body within 7-10 days. Unless you are told to come back to the emergency room, you may have your doctor or urgent care remove the stitches. If dissolving stitches were used in the mouth, these will fall out or dissolve without the need for removal. If tape closures ( Steri-Strips ) were used, remove them yourself if they have not fallen off after 7 days. If Dermabond skin glue was used, the film will fall off by itself in 5-10 days.   Get Prompt Medical Attention  if any of the following occur:    Increasing pain in the wound    Redness, swelling or pus coming from the wound    Fever over 101 F (38.3 C) oral    If stitches or staples come apart or fall out or if  Steri-Strips fall off before seven days    If the wound edges re-open    Bleeding not controlled by direct pressure    7350-4466 [employer]. [address] [place] [address], [place] [address]. All rights reserved. This information is not intended as a substitute for professional medical care. Always follow your healthcare professional's instructions.  This information has been modified by your health care provider with permission from the publisher.  Modifications clinically reviewed by Dr. Fabian Pelletier on 7/20/18.

## 2019-01-16 NOTE — PROGRESS NOTES
SUBJECTIVE:     Chief Complaint   Patient presents with     Urgent Care     Laceration     LT middle finger- was sweeping leaves outside, broom broke and cut pt's finger      Martin Bains is a 23 year old male who presents to the clinic with a laceration on the left finger sustained 1 hour(s) ago.  This is a non-work related injury.    Mechanism of injury: as above.    Associated symptoms: Denies numbness, weakness, or loss of function  Last tetanus booster within 10 years: yes    EXAM:   The patient appears today in alert,no apparent distress distress  VITALS: /82 (BP Location: Left arm, Patient Position: Sitting, Cuff Size: Adult Regular)   Pulse 97   Temp 98.8  F (37.1  C) (Tympanic)   Resp 16   Wt 72.6 kg (160 lb)   SpO2 99%   BMI 21.11 kg/m      Size of laceration: 1 centimeters  Characteristics of the laceration: bleeding- mild  Tendon function intact: yes  Sensation to light touch intact: yes  Pulses intact: not applicable  Picture included in patient's chart: no    Assessment:  Laceration of left middle finger without foreign body without damage to nail, initial encounter    PLAN:  PROCEDURE NOTE::  Wound was locally injected with 2 cc's of Lidocaine 1% plain  Wound soaked  Wound irrigated  Laceration was closed using 4 4-0 nylon interrupted sutures  After care instructions:  Keep wound clean and dry for the next 24-48 hours  Sutures out in 7 days  Signs of infection discussed today  May return to work as long as wound is kept clean and dry  Discussed the probability of scarring

## 2019-06-09 ENCOUNTER — OFFICE VISIT (OUTPATIENT)
Dept: URGENT CARE | Facility: URGENT CARE | Age: 24
End: 2019-06-09
Payer: COMMERCIAL

## 2019-06-09 VITALS
TEMPERATURE: 97.4 F | DIASTOLIC BLOOD PRESSURE: 78 MMHG | HEART RATE: 79 BPM | OXYGEN SATURATION: 98 % | SYSTOLIC BLOOD PRESSURE: 118 MMHG | RESPIRATION RATE: 16 BRPM

## 2019-06-09 DIAGNOSIS — H61.22 IMPACTED CERUMEN OF LEFT EAR: Primary | ICD-10-CM

## 2019-06-09 PROCEDURE — 99213 OFFICE O/P EST LOW 20 MIN: CPT | Performed by: PHYSICIAN ASSISTANT

## 2019-06-09 NOTE — PROGRESS NOTES
SUBJECTIVE:  Martin Bains is a 23 year old male who presents with left ear blockage for 1 day(s).   hx of wax I the right ear and had flushed in the past.  Now the left ear feels the same.  No other sx or concerns at this time  Severity: mild   Timing:gradual onset  Additional symptoms include no other URI or GI sx.      History of recurrent otitis: no    Generally healthy and no underlying medical issues    Hx of allergies  Current Outpatient Medications   Medication Sig Dispense Refill     Ascorbic Acid (VITAMIN C PO)        cholecalciferol (VITAMIN D) 1000 UNIT tablet Take 1 tablet (1,000 Units) by mouth daily 100 tablet 3     loratadine (CLARITIN) 10 MG tablet Take 1 tablet (10 mg) by mouth daily 30 tablet 1     Social History     Tobacco Use     Smoking status: Never Smoker     Smokeless tobacco: Never Used     Tobacco comment: non smoking home   Substance Use Topics     Alcohol use: No     Alcohol/week: 0.0 oz       ROS:   Review of systems negative except as stated above.    OBJECTIVE:  /78   Pulse 79   Temp 97.4  F (36.3  C) (Tympanic)   Resp 16   SpO2 98%    EXAM:  The right TM is normal: no effusions, no erythema, and normal landmarks     The right auditory canal is normal and without drainage, edema or erythema  The left TM is not visualized secondary to cerumen  The left auditory canal is obstructed with cerumen  Oropharynx exam is normal: no lesions, erythema, adenopathy or exudate.  GENERAL: no acute distress  EYES: EOMI,  PERRL, conjunctiva clear    ASSESSMENT:  Cerumen impaction    PLAN:  Left ear flushed and complete removal with no signs of infection or perforation .  Prevention discussed and to Follow-up with PCP as needed if sx worsen

## 2020-02-16 ENCOUNTER — HEALTH MAINTENANCE LETTER (OUTPATIENT)
Age: 25
End: 2020-02-16

## 2020-07-21 ENCOUNTER — TRANSFERRED RECORDS (OUTPATIENT)
Dept: HEALTH INFORMATION MANAGEMENT | Facility: CLINIC | Age: 25
End: 2020-07-21

## 2020-11-22 ENCOUNTER — HEALTH MAINTENANCE LETTER (OUTPATIENT)
Age: 25
End: 2020-11-22

## 2021-04-04 ENCOUNTER — HEALTH MAINTENANCE LETTER (OUTPATIENT)
Age: 26
End: 2021-04-04

## 2021-04-26 ENCOUNTER — TRANSFERRED RECORDS (OUTPATIENT)
Dept: HEALTH INFORMATION MANAGEMENT | Facility: CLINIC | Age: 26
End: 2021-04-26

## 2021-05-31 ASSESSMENT — ENCOUNTER SYMPTOMS
HEARTBURN: 0
PARESTHESIAS: 0
MYALGIAS: 0
HEADACHES: 0
CONSTIPATION: 0
DIZZINESS: 0
COUGH: 0
NAUSEA: 0
SHORTNESS OF BREATH: 0
DYSURIA: 0
HEMATURIA: 0
EYE PAIN: 0
JOINT SWELLING: 0
CHILLS: 0
HEMATOCHEZIA: 0
FREQUENCY: 0
ABDOMINAL PAIN: 0
DIARRHEA: 0
ARTHRALGIAS: 0
WEAKNESS: 0
NERVOUS/ANXIOUS: 0
FEVER: 0
SORE THROAT: 0
PALPITATIONS: 0

## 2021-06-01 ENCOUNTER — OFFICE VISIT (OUTPATIENT)
Dept: PEDIATRICS | Facility: CLINIC | Age: 26
End: 2021-06-01
Payer: COMMERCIAL

## 2021-06-01 VITALS
OXYGEN SATURATION: 99 % | DIASTOLIC BLOOD PRESSURE: 72 MMHG | HEART RATE: 76 BPM | WEIGHT: 166.9 LBS | SYSTOLIC BLOOD PRESSURE: 112 MMHG | RESPIRATION RATE: 12 BRPM | HEIGHT: 73 IN | TEMPERATURE: 98.3 F | BODY MASS INDEX: 22.12 KG/M2

## 2021-06-01 DIAGNOSIS — Z11.59 NEED FOR HEPATITIS C SCREENING TEST: ICD-10-CM

## 2021-06-01 DIAGNOSIS — Z00.00 ROUTINE GENERAL MEDICAL EXAMINATION AT A HEALTH CARE FACILITY: Primary | ICD-10-CM

## 2021-06-01 DIAGNOSIS — Z11.4 SCREENING FOR HIV (HUMAN IMMUNODEFICIENCY VIRUS): ICD-10-CM

## 2021-06-01 DIAGNOSIS — F40.10 SOCIAL ANXIETY DISORDER: ICD-10-CM

## 2021-06-01 LAB
CHOLEST SERPL-MCNC: 181 MG/DL
GLUCOSE SERPL-MCNC: 71 MG/DL (ref 70–99)
HDLC SERPL-MCNC: 55 MG/DL
LDLC SERPL CALC-MCNC: 101 MG/DL
NONHDLC SERPL-MCNC: 126 MG/DL
TRIGL SERPL-MCNC: 124 MG/DL

## 2021-06-01 PROCEDURE — 80061 LIPID PANEL: CPT | Performed by: INTERNAL MEDICINE

## 2021-06-01 PROCEDURE — 87389 HIV-1 AG W/HIV-1&-2 AB AG IA: CPT | Performed by: INTERNAL MEDICINE

## 2021-06-01 PROCEDURE — 86803 HEPATITIS C AB TEST: CPT | Performed by: INTERNAL MEDICINE

## 2021-06-01 PROCEDURE — 36415 COLL VENOUS BLD VENIPUNCTURE: CPT | Performed by: INTERNAL MEDICINE

## 2021-06-01 PROCEDURE — 82947 ASSAY GLUCOSE BLOOD QUANT: CPT | Performed by: INTERNAL MEDICINE

## 2021-06-01 PROCEDURE — 99395 PREV VISIT EST AGE 18-39: CPT | Performed by: INTERNAL MEDICINE

## 2021-06-01 ASSESSMENT — ENCOUNTER SYMPTOMS
HEARTBURN: 0
NERVOUS/ANXIOUS: 0
PARESTHESIAS: 0
PALPITATIONS: 0
SHORTNESS OF BREATH: 0
HEMATOCHEZIA: 0
FEVER: 0
MYALGIAS: 0
FREQUENCY: 0
EYE PAIN: 0
DIZZINESS: 0
WEAKNESS: 0
DIARRHEA: 0
ABDOMINAL PAIN: 0
CHILLS: 0
JOINT SWELLING: 0
SORE THROAT: 0
NAUSEA: 0
ARTHRALGIAS: 0
CONSTIPATION: 0
HEMATURIA: 0
COUGH: 0
HEADACHES: 0
DYSURIA: 0

## 2021-06-01 ASSESSMENT — MIFFLIN-ST. JEOR: SCORE: 1800.93

## 2021-06-01 NOTE — PROGRESS NOTES
SUBJECTIVE:   CC: Martin Bains is an 25 year old male who presents for preventative health visit.     Patient has been advised of split billing requirements and indicates understanding: Yes  Healthy Habits:     Getting at least 3 servings of Calcium per day:  Yes    Bi-annual eye exam:  Yes    Dental care twice a year:  Yes    Sleep apnea or symptoms of sleep apnea:  None    Diet:  Regular (no restrictions)    Frequency of exercise:  4-5 days/week    Duration of exercise:  15-30 minutes    Taking medications regularly:  Not Applicable    Medication side effects:  Not applicable    PHQ-2 Total Score: 0    Additional concerns today:  No  NOT fasting today    Does hiking, free weights.      Job is an academic .     Today's PHQ-2 Score:   PHQ-2 ( 1999 Pfizer) 5/31/2021   Q1: Little interest or pleasure in doing things 0   Q2: Feeling down, depressed or hopeless 0   PHQ-2 Score 0   Q1: Little interest or pleasure in doing things Not at all   Q2: Feeling down, depressed or hopeless Not at all   PHQ-2 Score 0     Abuse: Current or Past(Physical, Sexual or Emotional)- No  Do you feel safe in your environment? Yes    Have you ever done Advance Care Planning? (For example, a Health Directive, POLST, or a discussion with a medical provider or your loved ones about your wishes): No, advance care planning information given to patient to review.  Patient declined advance care planning discussion at this time.    Social History     Tobacco Use     Smoking status: Never Smoker     Smokeless tobacco: Never Used     Tobacco comment: non smoking home   Substance Use Topics     Alcohol use: No     Alcohol/week: 0.0 standard drinks     Alcohol Use 5/31/2021   Prescreen: >3 drinks/day or >7 drinks/week? No     Last PSA: No results found for: PSA    Reviewed orders with patient. Reviewed health maintenance and updated orders accordingly - Yes  Lab work is in process  Labs reviewed in EPIC  BP Readings from Last 3 Encounters:    21 112/72   19 118/78   01/15/19 126/82    Wt Readings from Last 3 Encounters:   21 75.7 kg (166 lb 14.4 oz)   01/15/19 72.6 kg (160 lb)   18 75.3 kg (166 lb)                  Patient Active Problem List   Diagnosis     Sensory integration disorder     Past Surgical History:   Procedure Laterality Date     HC CREATE EARDRUM OPENING,GEN ANESTH  1997    P.E. Tubes       Social History     Tobacco Use     Smoking status: Never Smoker     Smokeless tobacco: Never Used     Tobacco comment: non smoking home   Substance Use Topics     Alcohol use: No     Alcohol/week: 0.0 standard drinks     Family History   Problem Relation Age of Onset     C.A.D. Maternal Grandmother         [id number]   maternal uncleDM and CAD     Cancer Paternal Grandmother          lung CA     Cancer Paternal Grandfather         Hodkins  age 52     Family History Negative Mother          Current Outpatient Medications   Medication Sig Dispense Refill     Ascorbic Acid (VITAMIN C PO)        cholecalciferol (VITAMIN D) 1000 UNIT tablet Take 1 tablet (1,000 Units) by mouth daily 100 tablet 3     loratadine (CLARITIN) 10 MG tablet Take 1 tablet (10 mg) by mouth daily 30 tablet 1     No Known Allergies    Reviewed and updated as needed this visit by clinical staff  Tobacco  Allergies    Med Hx  Surg Hx  Fam Hx  Soc Hx        Reviewed and updated as needed this visit by Provider                  History reviewed. No pertinent past medical history.   Past Surgical History:   Procedure Laterality Date     HC CREATE EARDRUM OPENING,GEN ANESTH  1997    P.E. Tubes       Review of Systems   Constitutional: Negative for chills and fever.   HENT: Negative for congestion, ear pain, hearing loss and sore throat.    Eyes: Negative for pain and visual disturbance.   Respiratory: Negative for cough and shortness of breath.    Cardiovascular: Negative for chest pain, palpitations and peripheral edema.   Gastrointestinal:  "Negative for abdominal pain, constipation, diarrhea, heartburn, hematochezia and nausea.   Genitourinary: Negative for discharge, dysuria, frequency, genital sores, hematuria, impotence and urgency.   Musculoskeletal: Negative for arthralgias, joint swelling and myalgias.   Skin: Negative for rash.   Neurological: Negative for dizziness, weakness, headaches and paresthesias.   Psychiatric/Behavioral: Negative for mood changes. The patient is not nervous/anxious.      CONSTITUTIONAL: NEGATIVE for fever, chills, change in weight  INTEGUMENTARY/SKIN: NEGATIVE for worrisome rashes, moles or lesions  EYES: NEGATIVE for vision changes or irritation  ENT: NEGATIVE for ear, mouth and throat problems  RESP: NEGATIVE for significant cough or SOB  CV: NEGATIVE for chest pain, palpitations or peripheral edema  GI: NEGATIVE for nausea, abdominal pain, heartburn, or change in bowel habits   male: negative for dysuria, hematuria, decreased urinary stream, erectile dysfunction, urethral discharge  MUSCULOSKELETAL: NEGATIVE for significant arthralgias or myalgia  NEURO: NEGATIVE for weakness, dizziness or paresthesias  PSYCHIATRIC: NEGATIVE for changes in mood or affect    OBJECTIVE:   /72 (BP Location: Right arm, Cuff Size: Adult Regular)   Pulse 76   Temp 98.3  F (36.8  C) (Tympanic)   Resp 12   Ht 1.854 m (6' 1\")   Wt 75.7 kg (166 lb 14.4 oz)   SpO2 99%   BMI 22.02 kg/m      Physical Exam  GENERAL: healthy, alert and no distress  EYES: Eyes grossly normal to inspection, PERRL and conjunctivae and sclerae normal  HENT: ear canals and TM's normal, nose and mouth without ulcers or lesions  NECK: no adenopathy, no asymmetry, masses, or scars and thyroid normal to palpation  RESP: lungs clear to auscultation - no rales, rhonchi or wheezes  CV: regular rate and rhythm, normal S1 S2, no S3 or S4, no murmur, click or rub, no peripheral edema and peripheral pulses strong  ABDOMEN: soft, nontender, no hepatosplenomegaly, no " "masses and bowel sounds normal   (male): normal male genitalia without lesions or urethral discharge, no hernia  MS: no gross musculoskeletal defects noted, no edema  SKIN: no suspicious lesions or rashes  NEURO: Normal strength and tone, mentation intact and speech normal  PSYCH: mentation appears normal, affect normal/bright    Diagnostic Test Results:  Labs reviewed in Epic    ASSESSMENT/PLAN:   1. Routine general medical examination at a health care facility  Discussed diet, exercise, testicular self exam, blood pressure, cholesterol, and need for cancer surveillance at appropriate ages.   - HIV Antigen Antibody Combo  - Glucose  - Lipid panel reflex to direct LDL Fasting    2. Screening for HIV (human immunodeficiency virus)  Screening today.     3. Need for hepatitis C screening test  Screening today .  - Hepatitis C Screen Reflex to HCV RNA Quant and Genotype    Patient has been advised of split billing requirements and indicates understanding: Yes  COUNSELING:   Reviewed preventive health counseling, as reflected in patient instructions       Regular exercise       Healthy diet/nutrition       Vision screening       Hearing screening       Alcohol Use        Family planning       Colon cancer screening       Prostate cancer screening    Estimated body mass index is 22.02 kg/m  as calculated from the following:    Height as of this encounter: 1.854 m (6' 1\").    Weight as of this encounter: 75.7 kg (166 lb 14.4 oz).         He reports that he has never smoked. He has never used smokeless tobacco.      Counseling Resources:  ATP IV Guidelines  Pooled Cohorts Equation Calculator  FRAX Risk Assessment  ICSI Preventive Guidelines  Dietary Guidelines for Americans, 2010  USDA's MyPlate  ASA Prophylaxis  Lung CA Screening    Camilo Turner MD  [facility]  "

## 2021-06-01 NOTE — PATIENT INSTRUCTIONS
"Flu shot this fall.    Lab work today:  We can do labs in the exam room today, or you can get them done downstairs in the lab.      If you are going downstairs:  Directions:  As you walk through the first floor, you'll see (on the right) first the pharmacy, then some bathrooms, then the \"Lab and Imaging\" area. Give them your name at the window there and wait for them to call you.           Preventive Health Recommendations  Male Ages 21 - 25     Yearly exam:             See your health care provider every year in order to  o   Review health changes.   o   Discuss preventive care.    o   Review your medicines if your doctor has prescribed any.    You should be tested each year for STDs (sexually transmitted diseases).     Talk to your provider about cholesterol testing.      If you are at risk for diabetes, you should have a diabetes test (fasting glucose).    Shots: Get a flu shot each year. Get a tetanus shot every 10 years.     Nutrition:    Eat at least 5 servings of fruits and vegetables daily.     Eat whole-grain bread, whole-wheat pasta and brown rice instead of white grains and rice.     Get adequate calcium and Vitamin D.     Lifestyle    Exercise for at least 150 minutes a week (30 minutes a day, 5 days a week). This will help you control your weight and prevent disease.     Limit alcohol to one drink per day.     No smoking.     Wear sunscreen to prevent skin cancer.     See your dentist every six months for an exam and cleaning.     "

## 2021-06-02 LAB
HCV AB SERPL QL IA: NONREACTIVE
HIV 1+2 AB+HIV1 P24 AG SERPL QL IA: NONREACTIVE

## 2021-09-18 ENCOUNTER — HEALTH MAINTENANCE LETTER (OUTPATIENT)
Age: 26
End: 2021-09-18

## 2021-10-06 ENCOUNTER — TRANSFERRED RECORDS (OUTPATIENT)
Dept: HEALTH INFORMATION MANAGEMENT | Facility: CLINIC | Age: 26
End: 2021-10-06

## 2022-03-25 ENCOUNTER — TRANSFERRED RECORDS (OUTPATIENT)
Dept: HEALTH INFORMATION MANAGEMENT | Facility: CLINIC | Age: 27
End: 2022-03-25
Payer: COMMERCIAL

## 2022-06-27 ENCOUNTER — OFFICE VISIT (OUTPATIENT)
Dept: URGENT CARE | Facility: URGENT CARE | Age: 27
End: 2022-06-27
Payer: COMMERCIAL

## 2022-06-27 VITALS — OXYGEN SATURATION: 100 % | TEMPERATURE: 97.7 F | HEART RATE: 71 BPM | BODY MASS INDEX: 21.77 KG/M2 | WEIGHT: 165 LBS

## 2022-06-27 DIAGNOSIS — S39.011A ABDOMINAL WALL STRAIN, INITIAL ENCOUNTER: Primary | ICD-10-CM

## 2022-06-27 PROCEDURE — 99213 OFFICE O/P EST LOW 20 MIN: CPT | Performed by: FAMILY MEDICINE

## 2022-07-10 NOTE — PROGRESS NOTES
Assessment:       Abdominal wall strain, initial encounter     Plan:     Patient with symptoms consistent with an abdominal wall strain.  Low suspicion for intra-abdominal process.  Recommend heating pad, rest, ibuprofen as needed.  Anticipate normal healing.  Recommend active surveillance.  No further work-up indicated at this time but follow-up if symptoms getting worse or not improving over the next week.  Patient is agreeable with this plan    Subjective:       27 year old male presents for evaluation of a 1 week history of some pressure on the left side of his abdomen/flank.  He was playing volleyball the day prior to the onset of the discomfort.  It is worse when he twists and bends.  He feels like overall the pain is getting better.  He denies fevers, chills, nausea, vomiting, or radiation of the pain elsewhere.  Its not made worse with eating.  Normal bowel and bladder function.  Ibuprofen makes it better.    Patient Active Problem List   Diagnosis     Sensory integration disorder     Social anxiety disorder       History reviewed. No pertinent past medical history.    Past Surgical History:   Procedure Laterality Date     [facility] CREATE EARDRUM OPENING,GEN ANESTH      P.E. Tubes       Current Outpatient Medications   Medication     Ascorbic Acid (VITAMIN C PO)     cholecalciferol (VITAMIN D) 1000 UNIT tablet     Fluticasone Propionate (FLONASE NA)     loratadine (CLARITIN) 10 MG tablet     No current facility-administered medications for this visit.       No Known Allergies    Family History   Problem Relation Age of Onset     C.A.D. Maternal Grandmother         [id number]   maternal uncleDM and CAD     Cancer Paternal Grandmother          lung CA     Cancer Paternal Grandfather         Artemio  age 52     Family History Negative Mother        Social History     Socioeconomic History     Marital status: Single     Spouse name: None     Number of children: None     Years of education: None      Highest education level: None   Tobacco Use     Smoking status: Never Smoker     Smokeless tobacco: Never Used     Tobacco comment: non smoking home   Vaping Use     Vaping Use: Never used   Substance and Sexual Activity     Alcohol use: No     Alcohol/week: 0.0 standard drinks     Drug use: No     Sexual activity: Never   Other Topics Concern     Parent/sibling w/ CABG, MI or angioplasty before 65F 55M? No         Review of Systems  Pertinent items are noted in HPI.      Objective:     Pulse 71   Temp 97.7  F (36.5  C)   Wt 74.8 kg (165 lb)   SpO2 100%   BMI 21.77 kg/m       General appearance: alert, appears stated age and cooperative  Lungs: clear to auscultation bilaterally  Heart: Regular rate and rhythm  Abdomen: Patient with mild tenderness of the musculature over the left lateral abdominal wall.  No other abdominal tenderness.  No rebound or guarding.  Bowel sounds are present in all 4 quadrants.  No distention noted.  No organomegaly.  No overlying skin changes or rash.          This note has been dictated using voice recognition software. Any grammatical or context distortions are unintentional and inherent to the software

## 2022-08-20 ENCOUNTER — HEALTH MAINTENANCE LETTER (OUTPATIENT)
Age: 27
End: 2022-08-20

## 2022-11-20 ENCOUNTER — HEALTH MAINTENANCE LETTER (OUTPATIENT)
Age: 27
End: 2022-11-20

## 2023-05-30 SDOH — ECONOMIC STABILITY: TRANSPORTATION INSECURITY
IN THE PAST 12 MONTHS, HAS THE LACK OF TRANSPORTATION KEPT YOU FROM MEDICAL APPOINTMENTS OR FROM GETTING MEDICATIONS?: NO

## 2023-05-30 SDOH — ECONOMIC STABILITY: INCOME INSECURITY: IN THE LAST 12 MONTHS, WAS THERE A TIME WHEN YOU WERE NOT ABLE TO PAY THE MORTGAGE OR RENT ON TIME?: NO

## 2023-05-30 SDOH — ECONOMIC STABILITY: TRANSPORTATION INSECURITY
IN THE PAST 12 MONTHS, HAS LACK OF TRANSPORTATION KEPT YOU FROM MEETINGS, WORK, OR FROM GETTING THINGS NEEDED FOR DAILY LIVING?: NO

## 2023-05-30 SDOH — ECONOMIC STABILITY: INCOME INSECURITY: HOW HARD IS IT FOR YOU TO PAY FOR THE VERY BASICS LIKE FOOD, HOUSING, MEDICAL CARE, AND HEATING?: NOT VERY HARD

## 2023-05-30 SDOH — HEALTH STABILITY: PHYSICAL HEALTH: ON AVERAGE, HOW MANY DAYS PER WEEK DO YOU ENGAGE IN MODERATE TO STRENUOUS EXERCISE (LIKE A BRISK WALK)?: 5 DAYS

## 2023-05-30 SDOH — ECONOMIC STABILITY: FOOD INSECURITY: WITHIN THE PAST 12 MONTHS, YOU WORRIED THAT YOUR FOOD WOULD RUN OUT BEFORE YOU GOT MONEY TO BUY MORE.: NEVER TRUE

## 2023-05-30 SDOH — HEALTH STABILITY: PHYSICAL HEALTH: ON AVERAGE, HOW MANY MINUTES DO YOU ENGAGE IN EXERCISE AT THIS LEVEL?: 30 MIN

## 2023-05-30 SDOH — ECONOMIC STABILITY: FOOD INSECURITY: WITHIN THE PAST 12 MONTHS, THE FOOD YOU BOUGHT JUST DIDN'T LAST AND YOU DIDN'T HAVE MONEY TO GET MORE.: NEVER TRUE

## 2023-05-30 ASSESSMENT — ENCOUNTER SYMPTOMS
NAUSEA: 0
DIARRHEA: 0
SORE THROAT: 0
HEMATURIA: 0
CONSTIPATION: 0
EYE PAIN: 0
SHORTNESS OF BREATH: 0
HEADACHES: 0
MYALGIAS: 0
PALPITATIONS: 0
CHILLS: 0
HEARTBURN: 0
PARESTHESIAS: 0
WEAKNESS: 0
DYSURIA: 0
ARTHRALGIAS: 0
DIZZINESS: 0
JOINT SWELLING: 0
FREQUENCY: 0
HEMATOCHEZIA: 0
FEVER: 0
ABDOMINAL PAIN: 0
COUGH: 0
NERVOUS/ANXIOUS: 0

## 2023-05-30 ASSESSMENT — SOCIAL DETERMINANTS OF HEALTH (SDOH)
HOW OFTEN DO YOU ATTEND CHURCH OR RELIGIOUS SERVICES?: MORE THAN 4 TIMES PER YEAR
HOW OFTEN DO YOU GET TOGETHER WITH FRIENDS OR RELATIVES?: TWICE A WEEK
DO YOU BELONG TO ANY CLUBS OR ORGANIZATIONS SUCH AS CHURCH GROUPS UNIONS, FRATERNAL OR ATHLETIC GROUPS, OR SCHOOL GROUPS?: YES
IN A TYPICAL WEEK, HOW MANY TIMES DO YOU TALK ON THE PHONE WITH FAMILY, FRIENDS, OR NEIGHBORS?: MORE THAN THREE TIMES A WEEK
ARE YOU MARRIED, WIDOWED, DIVORCED, SEPARATED, NEVER MARRIED, OR LIVING WITH A PARTNER?: NEVER MARRIED

## 2023-05-30 ASSESSMENT — LIFESTYLE VARIABLES
HOW MANY STANDARD DRINKS CONTAINING ALCOHOL DO YOU HAVE ON A TYPICAL DAY: 1 OR 2
AUDIT-C TOTAL SCORE: 5
HOW OFTEN DO YOU HAVE A DRINK CONTAINING ALCOHOL: 2-3 TIMES A WEEK
HOW OFTEN DO YOU HAVE SIX OR MORE DRINKS ON ONE OCCASION: MONTHLY
SKIP TO QUESTIONS 9-10: 0

## 2023-06-06 ENCOUNTER — OFFICE VISIT (OUTPATIENT)
Dept: PEDIATRICS | Facility: CLINIC | Age: 28
End: 2023-06-06
Payer: COMMERCIAL

## 2023-06-06 VITALS
WEIGHT: 181.4 LBS | OXYGEN SATURATION: 94 % | HEIGHT: 72 IN | RESPIRATION RATE: 20 BRPM | SYSTOLIC BLOOD PRESSURE: 110 MMHG | DIASTOLIC BLOOD PRESSURE: 80 MMHG | HEART RATE: 92 BPM | BODY MASS INDEX: 24.57 KG/M2 | TEMPERATURE: 99.1 F

## 2023-06-06 DIAGNOSIS — Z00.00 ROUTINE GENERAL MEDICAL EXAMINATION AT A HEALTH CARE FACILITY: Primary | ICD-10-CM

## 2023-06-06 PROCEDURE — 99395 PREV VISIT EST AGE 18-39: CPT | Performed by: INTERNAL MEDICINE

## 2023-06-06 ASSESSMENT — ENCOUNTER SYMPTOMS
CONSTIPATION: 0
MYALGIAS: 0
HEARTBURN: 0
NAUSEA: 0
SHORTNESS OF BREATH: 0
CHILLS: 0
WEAKNESS: 0
DIARRHEA: 0
HEMATOCHEZIA: 0
SORE THROAT: 0
COUGH: 0
FEVER: 0
NERVOUS/ANXIOUS: 0
FREQUENCY: 0
ABDOMINAL PAIN: 0
DYSURIA: 0
DIZZINESS: 0
HEADACHES: 0
HEMATURIA: 0
JOINT SWELLING: 0
ARTHRALGIAS: 0
PALPITATIONS: 0
EYE PAIN: 0
PARESTHESIAS: 0

## 2023-06-06 ASSESSMENT — PAIN SCALES - GENERAL: PAINLEVEL: NO PAIN (0)

## 2023-06-06 NOTE — PATIENT INSTRUCTIONS
Up to date on shots, cancer screenings, and labs.        With respect to your wart:    Begin with soaking for 10 min every night.    Then scrape dead white skin with sandpaper.    Then apply Mediplast and leave on for 24 hours; reapply the next night.      Do this for at least 1 month.     Preventive Health Recommendations  Male Ages 26 - 39    Yearly exam:             See your health care provider every year in order to  o   Review health changes.   o   Discuss preventive care.    o   Review your medicines if your doctor has prescribed any.  You should be tested each year for STDs (sexually transmitted diseases), if you re at risk.   After age 35, talk to your provider about cholesterol testing. If you are at risk for heart disease, have your cholesterol tested at least every 5 years.   If you are at risk for diabetes, you should have a diabetes test (fasting glucose).  Shots: Get a flu shot each year. Get a tetanus shot every 10 years.     Nutrition:  Eat at least 5 servings of fruits and vegetables daily.   Eat whole-grain bread, whole-wheat pasta and brown rice instead of white grains and rice.   Get adequate Calcium and Vitamin D.     Lifestyle  Exercise for at least 150 minutes a week (30 minutes a day, 5 days a week). This will help you control your weight and prevent disease.   Limit alcohol to one drink per day.   No smoking.   Wear sunscreen to prevent skin cancer.   See your dentist every six months for an exam and cleaning.

## 2023-06-06 NOTE — PROGRESS NOTES
SUBJECTIVE:   CC: Martin is an 27 year old who presents for preventative health visit.       6/6/2023     2:27 PM   Additional Questions   Roomed by Suze SIDDIQI   Accompanied by DOROTHEA         6/6/2023     2:27 PM   Patient Reported Additional Medications   Patient reports taking the following new medications med list reviewed     Healthy Habits:     Getting at least 3 servings of Calcium per day:  Yes    Bi-annual eye exam:  Yes    Dental care twice a year:  Yes    Sleep apnea or symptoms of sleep apnea:  None    Diet:  Regular (no restrictions)    Frequency of exercise:  2-3 days/week    Duration of exercise:  30-45 minutes    Taking medications regularly:  Yes    Barriers to taking medications:  None    Medication side effects:  None    PHQ-2 Total Score: 0    Additional concerns today:  No      up to date on shots, blood work, and cancer screening.    Works out 2-3 days per week.    Diet generally healthy.         Today's PHQ-2 Score: 0      6/6/2023     2:11 PM   PHQ-2 ( 1999 [employer])   Q1: Little interest or pleasure in doing things 0   Q2: Feeling down, depressed or hopeless 0   PHQ-2 Score 0   Q1: Little interest or pleasure in doing things Not at all   Q2: Feeling down, depressed or hopeless Not at all   PHQ-2 Score 0           Social History     Tobacco Use     Smoking status: Never     Smokeless tobacco: Never     Tobacco comments:     non smoking home   Vaping Use     Vaping status: Never Used   Substance Use Topics     Alcohol use: No     Alcohol/week: 0.0 standard drinks of alcohol             5/30/2023     8:16 PM   Alcohol Use   Prescreen: >3 drinks/day or >7 drinks/week? No          View : No data to display.                Last PSA: No results found for: PSA    Reviewed orders with patient. Reviewed health maintenance and updated orders accordingly - Yes  Lab work is in process  Labs reviewed in EPIC  BP Readings from Last 3 Encounters:   06/06/23 110/80   06/01/21 112/72   06/09/19 118/78    Wt Readings from  Last 3 Encounters:   23 82.3 kg (181 lb 6.4 oz)   22 74.8 kg (165 lb)   21 75.7 kg (166 lb 14.4 oz)                  Patient Active Problem List   Diagnosis     Sensory integration disorder     Social anxiety disorder     Past Surgical History:   Procedure Laterality Date     [facility] CREATE EARDRUM OPENING,GEN ANESTH  1997    P.E. Tubes       Social History     Tobacco Use     Smoking status: Never     Smokeless tobacco: Never     Tobacco comments:     non smoking home   Vaping Use     Vaping status: Never Used   Substance Use Topics     Alcohol use: No     Alcohol/week: 0.0 standard drinks of alcohol     Family History   Problem Relation Age of Onset     C.A.D. Maternal Grandmother         [id number]   maternal uncleDM and CAD     Cancer Paternal Grandmother          lung CA     Cancer Paternal Grandfather         Hodemmy  age 52     Family History Negative Mother          Current Outpatient Medications   Medication Sig Dispense Refill     Ascorbic Acid (VITAMIN C PO)        cholecalciferol (VITAMIN D) 1000 UNIT tablet Take 1 tablet (1,000 Units) by mouth daily 100 tablet 3     Fluticasone Propionate (FLONASE NA)        loratadine (CLARITIN) 10 MG tablet Take 1 tablet (10 mg) by mouth daily 30 tablet 1     No Known Allergies    Reviewed and updated as needed this visit by clinical staff   Tobacco  Allergies  Meds              Reviewed and updated as needed this visit by Provider                   No past medical history on file.   Past Surgical History:   Procedure Laterality Date     [facility] CREATE EARDRUM OPENING,GEN ANESTH  1997    P.E. Tubes       Review of Systems   Constitutional: Negative for chills and fever.   HENT: Negative for congestion, ear pain, hearing loss and sore throat.    Eyes: Negative for pain and visual disturbance.   Respiratory: Negative for cough and shortness of breath.    Cardiovascular: Negative for chest pain, palpitations and peripheral edema.  "  Gastrointestinal: Negative for abdominal pain, constipation, diarrhea, heartburn, hematochezia and nausea.   Genitourinary: Negative for dysuria, frequency, genital sores, hematuria, impotence, penile discharge and urgency.   Musculoskeletal: Negative for arthralgias, joint swelling and myalgias.   Skin: Negative for rash.   Neurological: Negative for dizziness, weakness, headaches and paresthesias.   Psychiatric/Behavioral: Negative for mood changes. The patient is not nervous/anxious.      CONSTITUTIONAL: NEGATIVE for fever, chills, change in weight  INTEGUMENTARY/SKIN: NEGATIVE for worrisome rashes, moles or lesions  EYES: NEGATIVE for vision changes or irritation  ENT: NEGATIVE for ear, mouth and throat problems  RESP: NEGATIVE for significant cough or SOB  CV: NEGATIVE for chest pain, palpitations or peripheral edema  GI: NEGATIVE for nausea, abdominal pain, heartburn, or change in bowel habits   male: negative for dysuria, hematuria, decreased urinary stream, erectile dysfunction, urethral discharge  MUSCULOSKELETAL: NEGATIVE for significant arthralgias or myalgia  NEURO: NEGATIVE for weakness, dizziness or paresthesias  PSYCHIATRIC: NEGATIVE for changes in mood or affect    OBJECTIVE:   /80   Pulse 92   Temp 99.1  F (37.3  C) (Oral)   Resp 20   Ht 1.84 m (6' 0.44\")   Wt 82.3 kg (181 lb 6.4 oz)   SpO2 94%   BMI 24.30 kg/m      Physical Exam  GENERAL: healthy, alert and no distress  EYES: Eyes grossly normal to inspection, PERRL and conjunctivae and sclerae normal  HENT: ear canals and TM's normal, nose and mouth without ulcers or lesions  NECK: no adenopathy, no asymmetry, masses, or scars and thyroid normal to palpation  RESP: lungs clear to auscultation - no rales, rhonchi or wheezes  CV: regular rate and rhythm, normal S1 S2, no S3 or S4, no murmur, click or rub, no peripheral edema and peripheral pulses strong  ABDOMEN: soft, nontender, no hepatosplenomegaly, no masses and bowel sounds " normal  MS: no gross musculoskeletal defects noted, no edema  SKIN: no suspicious lesions or rashes  NEURO: Normal strength and tone, mentation intact and speech normal  PSYCH: mentation appears normal, affect normal/bright    Diagnostic Test Results:  Labs reviewed in Epic    ASSESSMENT/PLAN:   (Z00.00) Routine general medical examination at a health care facility  (primary encounter diagnosis)  Comment:   Plan: No concerns.  Doing well.     Patient has been advised of split billing requirements and indicates understanding: Yes      COUNSELING:   Reviewed preventive health counseling, as reflected in patient instructions       Regular exercise       Healthy diet/nutrition       Vision screening       Hearing screening       Colorectal cancer screening       Prostate cancer screening        He reports that he has never smoked. He has never used smokeless tobacco.            Camilo Turner MD  [facility]

## 2024-01-08 ENCOUNTER — OFFICE VISIT (OUTPATIENT)
Dept: URGENT CARE | Facility: URGENT CARE | Age: 29
End: 2024-01-08
Payer: COMMERCIAL

## 2024-01-08 VITALS
HEART RATE: 82 BPM | RESPIRATION RATE: 16 BRPM | DIASTOLIC BLOOD PRESSURE: 87 MMHG | BODY MASS INDEX: 24.28 KG/M2 | WEIGHT: 181.2 LBS | OXYGEN SATURATION: 96 % | TEMPERATURE: 97.8 F | SYSTOLIC BLOOD PRESSURE: 137 MMHG

## 2024-01-08 DIAGNOSIS — H61.22 IMPACTED CERUMEN OF LEFT EAR: Primary | ICD-10-CM

## 2024-01-08 PROCEDURE — 99213 OFFICE O/P EST LOW 20 MIN: CPT | Performed by: PHYSICIAN ASSISTANT

## 2024-01-09 NOTE — PROGRESS NOTES
SUBJECTIVE:  Martin Bains is a 28 year old male comes in with concerns for cerumen in his left ear.  Patient states that for the past day feels like there is water stuck in his ear.  He has used eardrops and feels muffled worse.  He does have a history of cerumen.  He denies any pain or trauma.  No other significant cough or cold symptoms.  He is otherwise in normal state of good health.    No past medical history on file.  Patient Active Problem List   Diagnosis    Sensory integration disorder    Social anxiety disorder     Current Outpatient Medications   Medication    Ascorbic Acid (VITAMIN C PO)    cholecalciferol (VITAMIN D) 1000 UNIT tablet    Fluticasone Propionate (FLONASE NA)    loratadine (CLARITIN) 10 MG tablet     No current facility-administered medications for this visit.     Social History     Socioeconomic History    Marital status: Single     Spouse name: Not on file    Number of children: Not on file    Years of education: Not on file    Highest education level: Not on file   Occupational History    Not on file   Tobacco Use    Smoking status: Never    Smokeless tobacco: Never    Tobacco comments:     non smoking home   Vaping Use    Vaping Use: Never used   Substance and Sexual Activity    Alcohol use: No     Alcohol/week: 0.0 standard drinks of alcohol    Drug use: No    Sexual activity: Never   Other Topics Concern    Parent/sibling w/ CABG, MI or angioplasty before 65F 55M? No   Social History Narrative    Not on file     Social Determinants of Health     Financial Resource Strain: Low Risk  (5/30/2023)    Overall Financial Resource Strain (CARDIA)     Difficulty of Paying Living Expenses: Not very hard   Food Insecurity: No Food Insecurity (5/30/2023)    Hunger Vital Sign     Worried About Running Out of Food in the Last Year: Never true     Ran Out of Food in the Last Year: Never true   Transportation Needs: No Transportation Needs (5/30/2023)    PRAPARE - Transportation     Lack of  Transportation (Medical): No     Lack of Transportation (Non-Medical): No   Physical Activity: Sufficiently Active (5/30/2023)    Exercise Vital Sign     Days of Exercise per Week: 5 days     Minutes of Exercise per Session: 30 min   Stress: No Stress Concern Present (5/30/2023)    [employer] [place] [employer] - Occupational Stress Questionnaire     Feeling of Stress : Only a little   Social Connections: Moderately Integrated (5/30/2023)    Social Connection and Isolation Panel [NHANES]     Frequency of Communication with Friends and Family: More than three times a week     Frequency of Social Gatherings with Friends and Family: Twice a week     Attends Sabianist Services: More than 4 times per year     Active Member of Clubs or Organizations: Yes     Attends Club or Organization Meetings: Not on file     Marital Status: Never    Interpersonal Safety: Not on file   Housing Stability: Low Risk  (5/30/2023)    Housing Stability Vital Sign     Unable to Pay for Housing in the Last Year: No     Number of Places Lived in the Last Year: 2     Unstable Housing in the Last Year: No     ROS  negative other than stated above    Exam:  GENERAL APPEARANCE: healthy, alert and no distress  EYES: EOMI,  PERRL  HENT: Right TM and canal clear.  Left TM obstructed with cerumen.  No nasal congestion noted  SKIN: no suspicious lesions or rashes    assessment/plan:  (H61.22) Impacted cerumen of left ear  (primary encounter diagnosis)  Comment:   Plan: Patient with impacted cerumen of the left ear.  Ear was flushed with complete removal of cerumen.  No perforation or infection noted.  Follow-up with primary as needed.  May use over-the-counter med if needed for pain.

## 2024-01-11 ASSESSMENT — PATIENT HEALTH QUESTIONNAIRE - PHQ9
10. IF YOU CHECKED OFF ANY PROBLEMS, HOW DIFFICULT HAVE THESE PROBLEMS MADE IT FOR YOU TO DO YOUR WORK, TAKE CARE OF THINGS AT HOME, OR GET ALONG WITH OTHER PEOPLE: VERY DIFFICULT
SUM OF ALL RESPONSES TO PHQ QUESTIONS 1-9: 12
SUM OF ALL RESPONSES TO PHQ QUESTIONS 1-9: 12

## 2024-01-12 ENCOUNTER — VIRTUAL VISIT (OUTPATIENT)
Dept: PEDIATRICS | Facility: CLINIC | Age: 29
End: 2024-01-12
Payer: COMMERCIAL

## 2024-01-12 DIAGNOSIS — F32.A DEPRESSION, UNSPECIFIED DEPRESSION TYPE: Primary | ICD-10-CM

## 2024-01-12 PROCEDURE — 96127 BRIEF EMOTIONAL/BEHAV ASSMT: CPT | Mod: 95 | Performed by: INTERNAL MEDICINE

## 2024-01-12 PROCEDURE — 99214 OFFICE O/P EST MOD 30 MIN: CPT | Mod: 95 | Performed by: INTERNAL MEDICINE

## 2024-01-12 NOTE — PATIENT INSTRUCTIONS
"Call for counseling:  Our counselor at [facility] is Roc Nunn, and the main number for [facility] counseling is [phone].  If he is not available, you can set up an appointment with another [facility] counselor that is.     You can also choose to followup with [facility] at [phone].    We can consider a trial of medicine like fluoxetine, lexapro if you'd like.    Keep avoiding alcohol or drugs as a way to cope with stress.     the book \"Full Catastrophe Living\", by Shola Olvera, and begin the course described therein.    Consider downloading a mindfulness and medidation justin such as [employer] [place] or [email or web address] from your justin store.     Need eventual paperwork for off Jan 29- Feb 14th, for leave of absence.     Camilo Turner MD  Internal Medicine and Pediatrics         "

## 2024-01-12 NOTE — PROGRESS NOTES
"Martin is a 28 year old who is being evaluated via a billable video visit.      How would you like to obtain your AVS?   If the video visit is dropped, the invitation should be resent by: Text to cell phone: [phone]  Will anyone else be joining your video visit? No          Assessment & Plan     Depression, unspecified depression type  Patient Instructions   Call for counseling:  Our counselor at [facility] is Roc Nunn, and the main number for [facility] counseling is [phone].  If he is not available, you can set up an appointment with another [facility] counselor that is.     You can also choose to followup with [facility] at [phone].    We can consider a trial of medicine like fluoxetine, lexapro if you'd like.    Keep avoiding alcohol or drugs as a way to cope with stress.     the book \"Full Catastrophe Living\", by Shola Olvera, and begin the course described therein.    Consider downloading a mindfulness and medidation justin such as [employer] or [employer] from your justin store.          Needs eventual paperwork for off Jan 29- Feb 14th, for leave of absence.   Will fill out once he gets it to us.     - Adult Mental Health  Referral; Future             Depression Screening Follow Up        1/11/2024     7:39 PM   PHQ   PHQ-9 Total Score 12   Q9: Thoughts of better off dead/self-harm past 2 weeks Not at all         1/11/2024     7:39 PM   Last PHQ-9   1.  Little interest or pleasure in doing things 1   2.  Feeling down, depressed, or hopeless 2   3.  Trouble falling or staying asleep, or sleeping too much 2   4.  Feeling tired or having little energy 2   5.  Poor appetite or overeating 1   6.  Feeling bad about yourself 2   7.  Trouble concentrating 1   8.  Moving slowly or restless 1   Q9: Thoughts of better off dead/self-harm past 2 weeks 0   PHQ-9 Total Score 12       Camilo Turner MD  [facility] ClinicDHARMESH Salazar is a 28 year old, presenting for the " "following health issues:    HAS BEEN HAVING SOME WORK related stressors.  Feels like is burnt out mentally with respect to his job.      Supervisor and he agreed that he can take a leave of absence.  Mainly feels lack of sleep, and this contributes to his physical symptoms.  Works as a . Gets headaches, short temper.  Causes more problems at work    Has been searching for a different job.  Going to meet with a career counselor.     In past, vacation time seems to help, but has never had to take a leave of absence. No similar symptoms with other jobs or school.    Has an EAP program that he's used, currently seeing them about once every 2 weeks.  Feels like it is helping \"somewhat\".  No suicidal ideation, homicidal ideation, or drugs of abuse.  PHQ was 11 yesterday.      Need eventual paperwork for off Jan 29- Feb 14th, for leave of absence.     No chief complaint on file.        6/6/2023     2:27 PM   Additional Questions   Roomed by Suze SIDDIQI   Accompanied by DOROTHEA CASIANO             Review of Systems         Objective           Vitals:  No vitals were obtained today due to virtual visit.    Physical Exam   GENERAL: Healthy, alert and no distress  EYES: Eyes grossly normal to inspection.  No discharge or erythema, or obvious scleral/conjunctival abnormalities.  RESP: No audible wheeze, cough, or visible cyanosis.  No visible retractions or increased work of breathing.    SKIN: Visible skin clear. No significant rash, abnormal pigmentation or lesions.  NEURO: Cranial nerves grossly intact.  Mentation and speech appropriate for age.  PSYCH: Mentation appears normal, affect normal/bright, judgement and insight intact, normal speech and appearance well-groomed.                Video-Visit Details    Type of service:  Video Visit     Originating Location (pt. Location): Home    Distant Location (provider location):  On-site  Platform used for Video Visit: Macie      "

## 2024-01-18 ENCOUNTER — TELEPHONE (OUTPATIENT)
Dept: PEDIATRICS | Facility: CLINIC | Age: 29
End: 2024-01-18
Payer: COMMERCIAL

## 2024-02-05 ENCOUNTER — TELEPHONE (OUTPATIENT)
Dept: PEDIATRICS | Facility: CLINIC | Age: 29
End: 2024-02-05
Payer: COMMERCIAL

## 2024-02-05 ENCOUNTER — MYC MEDICAL ADVICE (OUTPATIENT)
Dept: PEDIATRICS | Facility: CLINIC | Age: 29
End: 2024-02-05
Payer: COMMERCIAL

## 2024-02-05 NOTE — TELEPHONE ENCOUNTER
Forms/Letter Request    Type of form/letter: OTHER: FIT FOR DUTY       Do we have the form/letter: Yes:     Who is the form from? Patient    Where did/will the form come from? Patient or family brought in       When is form/letter needed by: ASAP    How would you like the form/letter returned:     Patient Notified form requests are processed in 5-7 business days:Yes    Could we send this information to you in [employer] or would you prefer to receive a phone call?:   Patient would like to be contacted via [employer]ility AND Call

## 2024-02-05 NOTE — TELEPHONE ENCOUNTER
Form placed on provider's desk.     See other encounter through [facility] Med Advice.    Maris Russo on 2/5/2024 at 12:56 PM

## 2024-02-05 NOTE — TELEPHONE ENCOUNTER
Hakanm and sent myc to inform pt that form is completed and will be at  .    Maris Russo on 2/5/2024 at 2:11 PM

## 2024-02-19 ENCOUNTER — TELEPHONE (OUTPATIENT)
Dept: PEDIATRICS | Facility: CLINIC | Age: 29
End: 2024-02-19
Payer: COMMERCIAL

## 2024-02-19 NOTE — TELEPHONE ENCOUNTER
Left VM needs instructions on what we need done with the forms. Also, received a call from the [place], while locating the form we were disconnected.  Claudia West MA

## 2024-02-21 NOTE — TELEPHONE ENCOUNTER
Forms/Letter Request    Type of form/letter: OTHER:        Do we have the form/letter: Yes:     Who is the form from? Insurance comp    Where did/will the form come from? form was faxed in    When is form/letter needed by: N/A    How would you like the form/letter returned: Fax : [phone]    Patient Notified form requests are processed in 5-7 business days:No    Could we send this information to you in [employer] or would you prefer to receive a phone call?:

## 2024-02-22 ENCOUNTER — TELEPHONE (OUTPATIENT)
Dept: PEDIATRICS | Facility: CLINIC | Age: 29
End: 2024-02-22
Payer: COMMERCIAL

## 2024-02-22 NOTE — TELEPHONE ENCOUNTER
Forms/Letter Request    Type of form/letter: Disability      Do we have the form/letter: Yes: Put in Dr. Turner's file in the side room by the     Who is the form from? Insurance comp    Where did/will the form come from? form was faxed in    When is form/letter needed by: asap    How would you like the form/letter returned: Fax : [phone]    Patient Notified form requests are processed in 5-7 business days:No    Could we send this information to you in [employer] or would you prefer to receive a phone call?:   Patient would prefer a phone call   Okay to leave a detailed message?: Yes at Home number on file [phone] (home)

## 2024-04-09 ENCOUNTER — VIRTUAL VISIT (OUTPATIENT)
Dept: PEDIATRICS | Facility: CLINIC | Age: 29
End: 2024-04-09
Payer: COMMERCIAL

## 2024-04-09 DIAGNOSIS — F41.1 GENERALIZED ANXIETY DISORDER: ICD-10-CM

## 2024-04-09 DIAGNOSIS — F84.0 AUTISTIC SPECTRUM DISORDER: Primary | ICD-10-CM

## 2024-04-09 DIAGNOSIS — F88 SENSORY INTEGRATION DISORDER: ICD-10-CM

## 2024-04-09 PROCEDURE — 99214 OFFICE O/P EST MOD 30 MIN: CPT | Mod: 95 | Performed by: INTERNAL MEDICINE

## 2024-04-09 NOTE — PROGRESS NOTES
Martin is a 28 year old who is being evaluated via a billable video visit.    How would you like to obtain your AVS? MyChart  If the video visit is dropped, the invitation should be resent by: Send to e-mail at: [email or web address]  Will anyone else be joining your video visit? No      Assessment & Plan     Autistic spectrum disorder      Generalized anxiety disorder      Sensory integration disorder      Filled out forms for 20 min for ADA diagnosis.   Suggested 30 min breaks, twice daily, and stated that his diagnosis meets ADA criteria.  Unable to focus or concentrate or think for extended periods.     No other issues discussed.               See Patient Instructions    Subjective   Martin is a 28 year old, presenting for the following health issues:  Forms        4/9/2024     3:45 PM   Additional Questions   Roomed by ZEKE Lee   Accompanied by n/a         4/9/2024     3:45 PM   Patient Reported Additional Medications   Patient reports taking the following new medications n/a     History of Present Illness       Reason for visit:  ADA Paperwork    He eats 2-3 servings of fruits and vegetables daily.He consumes 1 sweetened beverage(s) daily.He exercises with enough effort to increase his heart rate 30 to 60 minutes per day.  He exercises with enough effort to increase his heart rate 4 days per week.   He is taking medications regularly.               Review of Systems  Constitutional, neuro, ENT, endocrine, pulmonary, cardiac, gastrointestinal, genitourinary, musculoskeletal, integument and psychiatric systems are negative, except as otherwise noted.      Objective    Vitals - Patient Reported  Pain Score: No Pain (0)        Physical Exam   GENERAL: alert and no distress  PSYCH: Appropriate affect, tone, and pace of words          Video-Visit Details    Type of service:  Video Visit   Originating Location (pt. Location): Home    Distant Location (provider location):  On-site  Platform used for Video  Visit: [employer]  Signed Electronically by: Camilo Turner MD

## 2024-08-27 ENCOUNTER — MYC MEDICAL ADVICE (OUTPATIENT)
Dept: PEDIATRICS | Facility: CLINIC | Age: 29
End: 2024-08-27
Payer: COMMERCIAL

## 2024-08-27 DIAGNOSIS — R21 RASH: Primary | ICD-10-CM

## 2024-08-31 ENCOUNTER — HEALTH MAINTENANCE LETTER (OUTPATIENT)
Age: 29
End: 2024-08-31

## 2024-09-03 ENCOUNTER — TELEPHONE (OUTPATIENT)
Dept: PEDIATRICS | Facility: CLINIC | Age: 29
End: 2024-09-03
Payer: COMMERCIAL

## 2024-09-03 NOTE — TELEPHONE ENCOUNTER
[facility] called stating they are needing a referral placed through the [facility] website for pt to be seen at their facility. Pt was seen already on 7/23/24. [facility] is requesting a referral back dated starting 7/23/24 with multiple rechecks.     Qian Colni on 9/3/2024 at 10:08 AM

## 2024-12-01 ENCOUNTER — MYC MEDICAL ADVICE (OUTPATIENT)
Dept: PEDIATRICS | Facility: CLINIC | Age: 29
End: 2024-12-01
Payer: COMMERCIAL

## 2024-12-03 ENCOUNTER — VIRTUAL VISIT (OUTPATIENT)
Dept: PEDIATRICS | Facility: CLINIC | Age: 29
End: 2024-12-03
Payer: COMMERCIAL

## 2024-12-03 DIAGNOSIS — F84.0 AUTISTIC SPECTRUM DISORDER: Primary | ICD-10-CM

## 2024-12-03 DIAGNOSIS — F88 SENSORY INTEGRATION DISORDER: ICD-10-CM

## 2024-12-03 DIAGNOSIS — F41.1 GENERALIZED ANXIETY DISORDER: ICD-10-CM

## 2024-12-03 PROCEDURE — 99214 OFFICE O/P EST MOD 30 MIN: CPT | Mod: 95 | Performed by: INTERNAL MEDICINE

## 2024-12-03 NOTE — PROGRESS NOTES
Martin is a 29 year old who is being evaluated via a billable video visit.    What phone number would you like to be contacted at?   How would you like to obtain your AVS? MyChart      Assessment & Plan     Autistic spectrum disorder  Forms filled out for disability.    Needs frequent breaks and no more than 8 hours work in a row.      Generalized anxiety disorder      Sensory integration disorder  Problems interacting and taking feedback.              See Patient Instructions    Subjective   Martin is a 29 year old, presenting for the following health issues:  No chief complaint on file.    HPI                 Objective           Vitals:  No vitals were obtained today due to virtual visit.    Physical Exam   GENERAL: alert and no distress  PSYCH: Appropriate affect, tone, and pace of words          Video-Visit Details    Type of service:  Video Visit   Originating Location (pt. Location): Home    Distant Location (provider location):  On-site  [facility]  Signed Electronically by: Camilo Turner MD      E4: Spent 30 minutes with pre-visit, post visit, and face-to-face time.